# Patient Record
Sex: FEMALE | Race: WHITE | NOT HISPANIC OR LATINO | Employment: STUDENT | URBAN - METROPOLITAN AREA
[De-identification: names, ages, dates, MRNs, and addresses within clinical notes are randomized per-mention and may not be internally consistent; named-entity substitution may affect disease eponyms.]

---

## 2017-06-01 ENCOUNTER — TELEPHONE (OUTPATIENT)
Dept: PEDIATRIC NEUROLOGY | Facility: CLINIC | Age: 22
End: 2017-06-01

## 2017-06-01 NOTE — TELEPHONE ENCOUNTER
----- Message from Lilian Lei sent at 6/1/2017  2:15 PM CDT -----  Contact: briseyda/[place] on [address] [facility] [phone]  Needs refill for verapamil--- alexia states that pt. Insurance changed & is not able to get mail order

## 2017-12-06 ENCOUNTER — TELEPHONE (OUTPATIENT)
Dept: PEDIATRIC NEUROLOGY | Facility: CLINIC | Age: 22
End: 2017-12-06

## 2017-12-07 ENCOUNTER — TELEPHONE (OUTPATIENT)
Dept: PEDIATRIC NEUROLOGY | Facility: CLINIC | Age: 22
End: 2017-12-07

## 2017-12-28 ENCOUNTER — LAB VISIT (OUTPATIENT)
Dept: LAB | Facility: HOSPITAL | Age: 22
End: 2017-12-28
Attending: NURSE PRACTITIONER
Payer: COMMERCIAL

## 2017-12-28 ENCOUNTER — OFFICE VISIT (OUTPATIENT)
Dept: GASTROENTEROLOGY | Facility: CLINIC | Age: 22
End: 2017-12-28
Payer: COMMERCIAL

## 2017-12-28 ENCOUNTER — TELEPHONE (OUTPATIENT)
Dept: GASTROENTEROLOGY | Facility: CLINIC | Age: 22
End: 2017-12-28

## 2017-12-28 VITALS
DIASTOLIC BLOOD PRESSURE: 72 MMHG | BODY MASS INDEX: 28.6 KG/M2 | SYSTOLIC BLOOD PRESSURE: 122 MMHG | HEIGHT: 64 IN | WEIGHT: 167.56 LBS | HEART RATE: 59 BPM

## 2017-12-28 DIAGNOSIS — R10.30 LOWER ABDOMINAL PAIN: ICD-10-CM

## 2017-12-28 DIAGNOSIS — R19.7 DIARRHEA, UNSPECIFIED TYPE: ICD-10-CM

## 2017-12-28 DIAGNOSIS — R10.30 LOWER ABDOMINAL PAIN: Primary | ICD-10-CM

## 2017-12-28 LAB
ALBUMIN SERPL BCP-MCNC: 4.2 G/DL
ALP SERPL-CCNC: 50 U/L
ALT SERPL W/O P-5'-P-CCNC: 16 U/L
ANION GAP SERPL CALC-SCNC: 9 MMOL/L
AST SERPL-CCNC: 20 U/L
BASOPHILS # BLD AUTO: 0.03 K/UL
BASOPHILS NFR BLD: 0.4 %
BILIRUB SERPL-MCNC: 0.4 MG/DL
BUN SERPL-MCNC: 10 MG/DL
CALCIUM SERPL-MCNC: 9.6 MG/DL
CHLORIDE SERPL-SCNC: 105 MMOL/L
CO2 SERPL-SCNC: 27 MMOL/L
CREAT SERPL-MCNC: 0.7 MG/DL
CRP SERPL-MCNC: 0.2 MG/L
DIFFERENTIAL METHOD: NORMAL
EOSINOPHIL # BLD AUTO: 0.1 K/UL
EOSINOPHIL NFR BLD: 1.4 %
ERYTHROCYTE [DISTWIDTH] IN BLOOD BY AUTOMATED COUNT: 12.2 %
EST. GFR  (AFRICAN AMERICAN): >60 ML/MIN/1.73 M^2
EST. GFR  (NON AFRICAN AMERICAN): >60 ML/MIN/1.73 M^2
GLUCOSE SERPL-MCNC: 87 MG/DL
HCT VFR BLD AUTO: 37.6 %
HGB BLD-MCNC: 12.9 G/DL
IMM GRANULOCYTES # BLD AUTO: 0.01 K/UL
IMM GRANULOCYTES NFR BLD AUTO: 0.1 %
LYMPHOCYTES # BLD AUTO: 2.1 K/UL
LYMPHOCYTES NFR BLD: 29.7 %
MCH RBC QN AUTO: 30.6 PG
MCHC RBC AUTO-ENTMCNC: 34.3 G/DL
MCV RBC AUTO: 89 FL
MONOCYTES # BLD AUTO: 0.6 K/UL
MONOCYTES NFR BLD: 9.2 %
NEUTROPHILS # BLD AUTO: 4.1 K/UL
NEUTROPHILS NFR BLD: 59.2 %
NRBC BLD-RTO: 0 /100 WBC
PLATELET # BLD AUTO: 249 K/UL
PMV BLD AUTO: 10.7 FL
POTASSIUM SERPL-SCNC: 3.9 MMOL/L
PROT SERPL-MCNC: 7.6 G/DL
RBC # BLD AUTO: 4.21 M/UL
SODIUM SERPL-SCNC: 141 MMOL/L
TSH SERPL DL<=0.005 MIU/L-ACNC: 0.9 UIU/ML
WBC # BLD AUTO: 6.97 K/UL

## 2017-12-28 PROCEDURE — 85025 COMPLETE CBC W/AUTO DIFF WBC: CPT

## 2017-12-28 PROCEDURE — 99204 OFFICE O/P NEW MOD 45 MIN: CPT | Mod: S$GLB,,, | Performed by: NURSE PRACTITIONER

## 2017-12-28 PROCEDURE — 86140 C-REACTIVE PROTEIN: CPT

## 2017-12-28 PROCEDURE — 80053 COMPREHEN METABOLIC PANEL: CPT

## 2017-12-28 PROCEDURE — 36415 COLL VENOUS BLD VENIPUNCTURE: CPT

## 2017-12-28 PROCEDURE — 84443 ASSAY THYROID STIM HORMONE: CPT

## 2017-12-28 PROCEDURE — 99999 PR PBB SHADOW E&M-EST. PATIENT-LVL III: CPT | Mod: PBBFAC,,, | Performed by: NURSE PRACTITIONER

## 2017-12-28 RX ORDER — DICYCLOMINE HYDROCHLORIDE 10 MG/1
10 CAPSULE ORAL
Qty: 90 CAPSULE | Refills: 3 | Status: SHIPPED | OUTPATIENT
Start: 2017-12-28

## 2017-12-28 RX ORDER — PREGABALIN 100 MG/1
100 CAPSULE ORAL 2 TIMES DAILY
COMMUNITY

## 2017-12-28 NOTE — PROGRESS NOTES
[facility] Note    Reason for Visit:  The primary encounter diagnosis was Lower abdominal pain. A diagnosis of Diarrhea, unspecified type was also pertinent to this visit.    PCP:   Ari Berman       Referring MD:  Aaareferral Self  No address on file    HPI:  This is a 22 y.o. female here for evaluation of diarrhea and abdominal pain.      Abdominal pain   ONSET:since childhood  LOCATION:lower abd bilat  DURATION:intermittent; almost daily  CHARACTER:rumbling. cramping  ASSOCIATED/ALLEVIATING/AGGRAVAITING:+ nausea if hungry. Increased flatus michael if eating gluten or dairy. No vomiting. No fevers. Worse after meals. Sometimes better after BM. Worse with stress. Avoids gluten x 2 years with some improvement. Limits lactose x 1 year and takes lactaid as well with minimal improvement.   RADIATION:none  TEMPORAL:usually 1 hr after meals. Other times immediately after meals. Lasts for 1 hr or less  SEVERITY:4/10.    Reflux - no  Dysphagia/odynophagia - no   Bowel habits - tends toward diarrhea since childhood. Currently with 1-2 loose BM/day. With some urgency. Once weekly with formed stools.  GI bleeding - denies hematochezia, hematemesis, melena, BRBPR, black/tarry stools, and coffee ground emesis  NSAID usage - motrin for HAs almost daily for the past week, but usually takes it twice weekly. H/o migraines.         ROS:  Constitutional: No fevers, no chills, No unintentional weight loss, + fatigue,   ENT: No allergies  CV: No chest pain, no palpitations, no perif. edema, no sob on exertion  Pulm: No cough, No shortness of breath, no wheezes, no sputum  Ophtho: No vision changes  GI: see HPI;  Derm: No rash  Heme: No lymphadenopathy, No bruising  MSK: No arthritis, + muscle pain s/p fibromyalgia, no muscle weakness  : No dysuria, No hematuria  Endo: No hot or cold intolerance  Neuro: No syncope, No seizure,       Medical History:  has a past medical history of Fibromyalgia; Headache(784.0);  "and Migraine.    Surgical History:  has no past surgical history on file.    Family History: family history includes Cancer in her maternal grandmother and paternal grandfather; Celiac disease in her cousin; Crohn's disease in her maternal aunt; Diabetes in her father, maternal grandmother, and paternal grandmother; Heart disease in her maternal grandfather, maternal uncle, and mother; Migraines in her maternal aunt; Neuropathy in her maternal grandmother; Parkinsonism in her maternal grandmother; Seizures in her maternal aunt and maternal uncle; Stroke in her maternal grandmother; Tremor in her maternal uncle..     Social History:  reports that she has never smoked. She has never used smokeless tobacco. She reports that she drinks alcohol. She reports that she does not use drugs.    Review of patient's allergies indicates:  No Known Allergies    Current Outpatient Prescriptions   Medication Sig    melatonin 3 mg Tab Take by mouth. Three tablet every night.    pregabalin (LYRICA) 100 MG capsule Take 100 mg by mouth 2 (two) times daily.    verapamil (CALAN-SR) 120 MG CR tablet Take 1 tablet (120 mg total) by mouth nightly.    dicyclomine (BENTYL) 10 MG capsule Take 1 capsule (10 mg total) by mouth before meals as needed (TID PRN).     No current facility-administered medications for this visit.        Objective Findings:    Vital Signs:  /72   Pulse (!) 59   Ht 5' 4" (1.626 m)   Wt 76 kg (167 lb 8.8 oz)   BMI 28.76 kg/m²   Body mass index is 28.76 kg/m².    Physical Exam:  General Appearance: Well appearing in no acute distress  Head:   Normocephalic, without obvious abnormality  Eyes:    No scleral icterus, EOMI  ENT: Neck supple, Lips, mucosa, and tongue normal; teeth and gums normal  Lungs: CTA bilaterally in anterior and posterior fields, no wheezes, no crackles.  Heart:  Regular rate and rhythm, S1, S2 normal, no murmurs heard  Abdomen: Soft, non tender, non distended with positive bowel sounds in " all four quadrants. No hepatosplenomegaly, ascites, or mass  Extremities: 2+ radial pulses, no clubbing, cyanosis or edema  Skin: No rash to exposed areas  Neurologic: A&Ox4      Labs:  No results found for: WBC, HGB, HCT, PLT, CHOL, TRIG, HDL, LDLDIRECT, ALT, AST, NA, K, CL, CREATININE, BUN, CO2, TSH, PSA, INR, GLUF, HGBA1C, MICROALBUR    Imaging:  none  Endoscopy:    none  Assessment:  1. Lower abdominal pain    2. Diarrhea, unspecified type           Recommendations:  1,2. Many elements of IBS. Routine labs w/ TSH. Stools r/o infections. Start bentyl 3 times daily b/f meals as RX. Start daily probiotic as per handout provided. Celiac screening on 100 g daily gluten load x 2 weeks offered. Pt declines and states abd pain and gas is much worse when she inadvertently ingests gluten so she'd rather not do the gluten challenge.     Return in about 2 months (around 2/28/2018) for diarrhea, abd pain.      Order summary:  Orders Placed This Encounter    Stool culture    Stool Exam-Ova,Cysts,Parasites    Stool Exam-Ova,Cysts,Parasites    Stool Exam-Ova,Cysts,Parasites    Giardia / Cryptosporidum, EIA    Giardia / Cryptosporidum, EIA    Giardia / Cryptosporidum, EIA    WBC, Stool    CBC auto differential    Comprehensive metabolic panel    C-reactive protein    TSH    dicyclomine (BENTYL) 10 MG capsule         Thank you so much for allowing me to participate in the care of Kimmy Donnelly    Salome Richardson, APRN, FNP-C

## 2017-12-28 NOTE — PATIENT INSTRUCTIONS
"     Probiotics      For IBS and bloating, we typically recommend Align, VSL#3, or Avalos Colon Health, which can typically be found without a prescription at the pharmacy. Give this at least  a month to work, but can take up to 3 months to repopulate your intestines, so be patient!     VSL#3 is a potent probiotic which can be found in pill form (try TweetUp for best price, or VSL3.com). $52 for a 2 month supply. The sachets are for ulcerative colitis and require a prescription.    If you go on the internet, a reputable/powerful probiotic appears to be Super Shield from [employer] at: http://www.bluerockholistics.Genmab/product/pross.asp  You can also choose PB 8 which can be found at [employer] or online.    For diarrhea from travel or antibiotics, we typically recommend Culturelle or Florastor (especially for diarrhea from C diff infection).         General information:  Pick one that has at least seven strains, and five billion CFU (colony forming units).    Products containing probiotics have flooded the market in recent years. As more people seek natural or non-drug ways to maintain their health, manufacturers have responded by offering probiotics in everything from yogurt to chocolate and granola bars to powders and capsules.    Although probiotics have been around for generations - think of the "live active cultures"in several brands of yogurt - the sheer number of products with probiotics now available may overwhelm even the most conscientious of shoppers. In some respects, the industry has grown faster than the research and scientists and doctors are calling for more studies to help determine which probiotics are beneficial and which might be a waste of money.    What Are Probiotics?  Probiotics are living microscopic organisms, or micro-organisms, that scientific research has shown to benefit your health. Most often they are bacteria, but they may also be other organisms such as yeasts. In some " cases they are similar, or the same, as the good bacteria already in your body, particularly those in your gut.    The most common probiotic bacteria come from two groups, Lactobacillus or Bifidobacterium, although it is important to remember that there are many other types of bacteria that are also classified as probiotics. Each group of bacteria has different species and each species has different strains. This is important to remember because different strains have different benefits for different parts of your body. For example, Lactobacillus casei Shirota has been shown to support the immune system and to help food move through the gut, but Lactobacillus bulgaricus may help relieve symptoms of lactose intolerance, a condition in which people cannot digest the lactose found in most milk and dairy products. In general, not all probiotics are the same, and they dont all work the same way.    Scientists are still sorting out exactly how probiotics work. They may:       Boost your immune system by producing antibodies for certain viruses.  Produce substances that prevent infection.  Prevent harmful bacteria from attaching to the gut wall and growing there.  Send signals to your cells to strengthen the mucus in your intestine and help it act as a barrier against infection.  Inhibit or destroy toxins released by certain bad bacteria that can make you sick.  Produce B vitamins necessary for metabolizing the food you eat, warding off anemia caused by deficiencies in B-6 and B-12, and maintaining healthy skin and a healthy nervous system.      Common Uses  Probiotics are most often used to promote digestive health. Because there are different kinds of probiotics, it is important to find the right one for the specific health benefit you seek. Researchers are still studying which probiotic should be used for which health or disease state. Nevertheless, probiotics have been shown to help regulate the movement of food  through the intestine. They also may help treat digestive disease, something of much interest to gastroenterologists. Some of the most common uses for probiotics include the treatment of the following:    Irritable Bowel Syndrome    Irritable bowel syndrome (IBS) is a disorder of movement in the gut. People who have IBS may have diarrhea, constipation or alternating bouts of both. IBS is not caused by injury or illness. Often the only way doctors can diagnose it is to rule out other conditions through testing.    Probiotics, particularly Bifidobacterium infantis, Sacchromyces boulardii, Lactobacillus plantarum and combination probiotics may help regulate how often people with IBS have bowel movements. Probiotics may also help relieve bloating from gas. Bifidobacterium infantis 36362, Lactobacillus plantarum 299V or Bifidobacterium bifidum [id number] have been shown to help regulate bowel movements and relieve bloating, pain and gas.    Inflammatory Bowel Disease    Though some of the symptoms are the same, inflammatory bowel disease (IBD) is different from IBS because in IBD, the intestines become inflamed. Unlike IBS, IBD is a disorder of the immune system. Symptoms include abdominal cramps, pain, diarrhea, weight loss and blood in your stools. In Crohns disease, ulcers may develop anywhere in your intestine including both the large and small bowels. In ulcerative colitis, inflammation only involves the large intestine. Bouts of inflammation may come and go, but in some cases, prescription medication is needed to keep inflammation in check.        Some studies suggest that probiotics may help decrease inflammation and delay the next bout of disease. Ulcerative colitis seems to respond better to probiotics than Crohns disease does. So far, E. coli Nissle, and a mixture of several strains of Lactobacillus, Bifidobacterium and Streptococcus may be most beneficial. Research is continuing to determine which probiotics  are best to treat IBD.    Infectious Diarrhea    Infectious diarrhea is caused by bacteria, viruses or parasites. There is evidence that probiotics such as Lactobacillus rhamnosus and Lactobacillus casei may be particularly helpful in treating diarrhea caused by rotavirus, which often affects babies and small children. Several strains of Lactobacillus and a strain of the yeast Saccharomyces boulardii may help treat and shorten the course of infectious diarrhea.    Antibiotic-Related Diarrhea  Take Lactobacillus rhamnosus GG and/or Saccharomyces boulardii (Culturelle and/or Florastor) six hours after each dose of antibiotics. Increase the dose to 10 billion CFUs per day and continue for one to two weeks after you stop taking the antibiotic.    Sometimes taking an antibiotic can cause infectious diarrhea by reducing the number of good microorganisms in your gut. Then bacteria that normally do not give you any trouble can grow out of control. One such bacterium is Clostridium difficile, which is a major cause of diarrhea in hospitalized patients and people in long-term care facilities like nursing homes. The trouble with Clostridium difficile is that it tends to come back, but there is evidence that taking probiotics such as Saccharomyces boulardii may help prevent this. There is also evidence that taking probiotics when you first start taking an antibiotic may help prevent antibiotic-related diarrhea in the first place.    Travelers Diarrhea    Its possible to get infectious diarrhea when you travel and your body is exposed to new, normally harmless bacteria (travelers diarrhea). Most studies show that probiotics are not very effective in preventing or treating travelers diarrhea in adults. Taking Saccharomyces boulardii weeks before your trip may help prevent travelers diarrhea, which usually comes from ingesting food or water thats been contaminated with bacteria.    Other Uses    Other potential uses for  probiotics include maintaining a healthy mouth, preventing and treating certain skin conditions like eczema, promoting health in the urinary tract and vagina, and preventing allergies (especially in children). There is not as much research about these uses as there is about the benefits of probiotics for your digestive system, and studies have had mixed results. If you have eczema ?Lactobacillus rhamnosus  and Lactobacillus fermentum VRI-003 PCC have been shown to help treat those itchy, scaly skin rashes -- especially in children.If you have a cold ?Some research suggests that Bifidobacterium animalis lactis Bi-07 and Lactobacillus acidophilus NCFM can help reduce the duration and severity of the common cold and flu by enhancing the bodys production of antibodies. If you have a vaginal infection ?Lactobacillus acidophilus, Lactobacillus rhamnosus GR-1 and Lactobacillus reuteri RC-14 have been shown to help prevent and clear up bacterial vaginosis and urinary tract infections in some individuals. Researchers point to Lactobacillus reuteri RC-14 and Lactobacillus rhamnosus GR-1 as the most effective stains to protect against yeast infections as theyre especially adept at colonizing the vaginal environment and fighting off unwelcome bacteria and fungi. If you have bad breath, gingivitis or periodontitis ?A probiotic lozenge or mouthwash might be your best bet. Lactobacillus reuteri LR-1 or LR-2 promote oral health by binding to teeth and gums, preventing plaque formation in the mouth. Research has demonstrated the ability of Weissella cibaria to freshen breath by inhibiting the production of sulfur compounds in the mouth.    Are Probiotics Safe?  It is generally thought that most probiotics are safe, although it is not yet known if they are safe for people with severely impaired immune systems. They may be taken by people without a diagnosed digestive problem. Their safety is evident since they have a long  history of use in dairy foods like yogurt, cheese and milk.    However, you should talk to your doctor before adding these or any other probiotics to your diet. Probiotics might not be appropriate for seniors. Some probiotics may interfere with or interact with medications. Your doctor will be able to help you determine if probiotics are right for you based on your medical history.    Research about the use of probiotics in children has grown in recent years. Although studies have shown that probiotics may help to treat infectious diarrhea in babies and small children, researchers are unsure whether probiotics are particularly helpful for children with Crohns disease or other types of inflammatory bowel disease. Ask your childs pediatrician about probiotics before giving them to your child.    The exception here is breastfeeding. Breast milk stimulates the growth of normal gut organisms that are important for a babys digestive health and developing immune system. That is one reason why doctors strongly encourage mothers to breastfeed their babies.    Overall, scientists agree that more research is necessary before they can make blanket statements about the safety of probiotics in general or about individual groups and strains. Future studies will show whether probiotics can be used to treat diseases, are safe to use for a long time, and if it is possible to take too many probiotics or mix them in inappropriate ways. These studies will also guide us as to which probiotics to use for different disorders.    Keep in mind that probiotics are considered dietary supplements and are not FDA-regulated like drugs. They are not standardized, meaning they are made in different ways by different companies and have different additives. How well a probiotic works may differ from brand to brand and even from batch to batch within the same brand. Probiotics also vary tremendously in their cost, and cost does not necessarily  reflect higher quality.    Side effects may vary, too. The most common are gas and bloating. These are usually mild and temporary. More serious side effects include allergic reactions, either to the probiotics themselves or to other ingredients in the food or supplement.    Choosing a Probiotic  Probiotics are available in yogurt and other dairy products, chocolate and granola bars, juices, powders, and capsules. You can purchase them at your local supermarket or [employer] food store as well as on the Internet. Here are some tips to help you choose.    Check the label. The more information there is on the label, the better. Ideally, the label will tell you the probiotics group, species and strain, and how many of the microorganisms will still be alive on the use-by date. Although some products guarantee how many organisms were present at the time it was manufactured, often it is less clear how many organisms are present when these products are actually consumed.    Call the . Unfortunately, many labels dont say exactly which strain is in the product; many list only the group and the species, such as Lactobacillus acidophilus or Bifidobacterium lactis. If youre planning to take a probiotic for a specific condition, call the company and find out exactly which strains its products contain and what research they have done to support their health claims. You may be able to find this information on their Web site, as well.    Beware of the Internet. If you order products from the Internet, make sure you know the company from which you are ordering. There are plenty of scammers out there who are willing to send you fake products labeled as probiotics. At best, the ingredients could be harmless, like garlic powder. At worst, they could be laced with powerful herbs, prescription medications or illegal drugs. Some companies may simply take your money and disappear.    Stick to well-established companies and  companies you know. The longer a company has been around, the more likely its products have been tested and studied repeatedly and the bigger the reputation the company has to protect. Some manufacturers that have been making products with probiotics for a while are [employer], [employer], [employer], [employer], [employer], [employer], [employer], and [employer].    Storage    One final note: Remember to store your probiotic according to package instructions and make sure the product has a sell-by or expiration date. Probiotics are living organisms. Even if they are dried and dormant, like in a powder or capsule, they must be stored properly or they will die. Some require refrigeration whereas others do not. They also have a shelf-life, so make sure you use them before the expiration date on the package.

## 2018-01-02 ENCOUNTER — LAB VISIT (OUTPATIENT)
Dept: LAB | Facility: HOSPITAL | Age: 23
End: 2018-01-02
Payer: COMMERCIAL

## 2018-01-02 DIAGNOSIS — R19.7 DIARRHEA, UNSPECIFIED TYPE: ICD-10-CM

## 2018-01-02 PROCEDURE — 87427 SHIGA-LIKE TOXIN AG IA: CPT

## 2018-01-02 PROCEDURE — 87045 FECES CULTURE AEROBIC BACT: CPT

## 2018-01-02 PROCEDURE — 87209 SMEAR COMPLEX STAIN: CPT | Mod: 91

## 2018-01-02 PROCEDURE — 89055 LEUKOCYTE ASSESSMENT FECAL: CPT

## 2018-01-02 PROCEDURE — 87046 STOOL CULTR AEROBIC BACT EA: CPT

## 2018-01-02 PROCEDURE — 87329 GIARDIA AG IA: CPT

## 2018-01-03 LAB
CRYPTOSP AG STL QL IA: NEGATIVE
E COLI SXT1 STL QL IA: NEGATIVE
E COLI SXT2 STL QL IA: NEGATIVE
G LAMBLIA AG STL QL IA: NEGATIVE
O+P STL TRI STN: NORMAL

## 2018-01-04 LAB — WBC #/AREA STL HPF: NORMAL /[HPF]

## 2018-01-06 LAB — BACTERIA STL CULT: NORMAL

## 2018-01-08 ENCOUNTER — TELEPHONE (OUTPATIENT)
Dept: GASTROENTEROLOGY | Facility: CLINIC | Age: 23
End: 2018-01-08

## 2019-06-15 NOTE — TELEPHONE ENCOUNTER
----- Message from Palmira Farmer sent at 12/6/2017  2:11 PM CST -----  Contact: Pt's mom   Mom is calling in regards to scheduling a checkup and refill on meds appt for pt. The first available appt is on 01/04.Per mom pt would like to be seen between 12/20-to the end of the year while she is on break.    Mom can be reached at [phone].    Thank you   
Spoke with mom, she is requesting a Adult Neurologist referral.  I told mom, I will give her a call back, once  responses.  Mom understood.  
patient